# Patient Record
Sex: MALE | Race: BLACK OR AFRICAN AMERICAN | NOT HISPANIC OR LATINO | URBAN - METROPOLITAN AREA
[De-identification: names, ages, dates, MRNs, and addresses within clinical notes are randomized per-mention and may not be internally consistent; named-entity substitution may affect disease eponyms.]

---

## 2018-04-01 ENCOUNTER — EMERGENCY (EMERGENCY)
Facility: HOSPITAL | Age: 38
LOS: 1 days | Discharge: HOME | End: 2018-04-01
Admitting: EMERGENCY MEDICINE
Payer: SELF-PAY

## 2018-04-01 VITALS
DIASTOLIC BLOOD PRESSURE: 89 MMHG | HEART RATE: 95 BPM | SYSTOLIC BLOOD PRESSURE: 133 MMHG | WEIGHT: 179.9 LBS | OXYGEN SATURATION: 96 % | HEIGHT: 70 IN | RESPIRATION RATE: 18 BRPM | TEMPERATURE: 100 F

## 2018-04-01 DIAGNOSIS — M54.2 CERVICALGIA: ICD-10-CM
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
## 2018-04-01 DIAGNOSIS — M54.5 LOW BACK PAIN: ICD-10-CM

## 2018-04-01 PROCEDURE — 99283 EMERGENCY DEPT VISIT LOW MDM: CPT | Mod: 25

## 2018-04-01 PROCEDURE — 99283 EMERGENCY DEPT VISIT LOW MDM: CPT

## 2018-04-01 PROCEDURE — 96372 THER/PROPH/DIAG INJ SC/IM: CPT

## 2018-04-01 RX ORDER — DICLOFENAC SODIUM 75 MG/1
1 TABLET, DELAYED RELEASE ORAL
Qty: 30 | Refills: 0
Start: 2018-04-01 | End: 2018-04-10

## 2018-04-01 RX ORDER — DIAZEPAM 5 MG
1 TABLET ORAL
Qty: 15 | Refills: 0
Start: 2018-04-01 | End: 2018-04-05

## 2018-04-01 RX ORDER — KETOROLAC TROMETHAMINE 30 MG/ML
30 SYRINGE (ML) INJECTION ONCE
Qty: 0 | Refills: 0 | Status: DISCONTINUED | OUTPATIENT
Start: 2018-04-01 | End: 2018-04-01

## 2018-04-01 RX ORDER — DIAZEPAM 5 MG
5 TABLET ORAL ONCE
Qty: 0 | Refills: 0 | Status: DISCONTINUED | OUTPATIENT
Start: 2018-04-01 | End: 2018-04-01

## 2018-04-01 RX ADMIN — Medication 5 MILLIGRAM(S): at 14:09

## 2018-04-01 RX ADMIN — Medication 30 MILLIGRAM(S): at 14:07
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
## 2019-10-02 ENCOUNTER — INPATIENT (INPATIENT)
Facility: HOSPITAL | Age: 39
LOS: 0 days | Discharge: HOME | DRG: 313 | End: 2019-10-03
Attending: INTERNAL MEDICINE | Admitting: INTERNAL MEDICINE
Payer: COMMERCIAL

## 2019-10-02 VITALS
HEART RATE: 74 BPM | DIASTOLIC BLOOD PRESSURE: 86 MMHG | SYSTOLIC BLOOD PRESSURE: 127 MMHG | WEIGHT: 175.05 LBS | RESPIRATION RATE: 16 BRPM | OXYGEN SATURATION: 98 % | TEMPERATURE: 98 F

## 2019-10-02 DIAGNOSIS — R07.9 CHEST PAIN, UNSPECIFIED: ICD-10-CM

## 2019-10-02 DIAGNOSIS — Z91.89 OTHER SPECIFIED PERSONAL RISK FACTORS, NOT ELSEWHERE CLASSIFIED: ICD-10-CM

## 2019-10-02 DIAGNOSIS — R63.8 OTHER SYMPTOMS AND SIGNS CONCERNING FOOD AND FLUID INTAKE: ICD-10-CM

## 2019-10-02 LAB
CHOLEST SERPL-MCNC: 131 MG/DL — SIGNIFICANT CHANGE UP (ref 10–199)
CK MB CFR SERPL CALC: 1.3 NG/ML — SIGNIFICANT CHANGE UP (ref 0–6.7)
CK SERPL-CCNC: 140 U/L — SIGNIFICANT CHANGE UP (ref 30–200)
CRP SERPL-MCNC: 0.06 MG/DL — SIGNIFICANT CHANGE UP (ref 0–0.4)
ERYTHROCYTE [SEDIMENTATION RATE] IN BLOOD: 1 MM/HR — SIGNIFICANT CHANGE UP
HDLC SERPL-MCNC: 45 MG/DL — SIGNIFICANT CHANGE UP
LIPID PNL WITH DIRECT LDL SERPL: 75 MG/DL — SIGNIFICANT CHANGE UP
TOTAL CHOLESTEROL/HDL RATIO MEASUREMENT: 2.9 RATIO — LOW (ref 3.4–9.6)
TRIGL SERPL-MCNC: 56 MG/DL — SIGNIFICANT CHANGE UP (ref 10–149)
TROPONIN T SERPL-MCNC: <0.01 NG/ML — SIGNIFICANT CHANGE UP (ref 0–0.01)
TSH SERPL-MCNC: 0.79 UIU/ML — SIGNIFICANT CHANGE UP (ref 0.35–4.94)

## 2019-10-02 PROCEDURE — 93010 ELECTROCARDIOGRAM REPORT: CPT | Mod: 76

## 2019-10-02 PROCEDURE — 71046 X-RAY EXAM CHEST 2 VIEWS: CPT | Mod: 26

## 2019-10-02 PROCEDURE — 99285 EMERGENCY DEPT VISIT HI MDM: CPT | Mod: 25

## 2019-10-02 RX ORDER — ACETAMINOPHEN 500 MG
650 TABLET ORAL EVERY 6 HOURS
Refills: 0 | Status: DISCONTINUED | OUTPATIENT
Start: 2019-10-02 | End: 2019-10-03

## 2019-10-02 RX ORDER — ATORVASTATIN CALCIUM 80 MG/1
80 TABLET, FILM COATED ORAL AT BEDTIME
Refills: 0 | Status: DISCONTINUED | OUTPATIENT
Start: 2019-10-02 | End: 2019-10-03

## 2019-10-02 RX ORDER — ACETAMINOPHEN 500 MG
650 TABLET ORAL ONCE
Refills: 0 | Status: COMPLETED | OUTPATIENT
Start: 2019-10-02 | End: 2019-10-02

## 2019-10-02 RX ORDER — CLOPIDOGREL BISULFATE 75 MG/1
300 TABLET, FILM COATED ORAL ONCE
Refills: 0 | Status: DISCONTINUED | OUTPATIENT
Start: 2019-10-02 | End: 2019-10-02

## 2019-10-02 RX ORDER — NITROGLYCERIN 6.5 MG
0.4 CAPSULE, EXTENDED RELEASE ORAL ONCE
Refills: 0 | Status: COMPLETED | OUTPATIENT
Start: 2019-10-02 | End: 2019-10-02

## 2019-10-02 RX ORDER — CLOPIDOGREL BISULFATE 75 MG/1
600 TABLET, FILM COATED ORAL ONCE
Refills: 0 | Status: COMPLETED | OUTPATIENT
Start: 2019-10-02 | End: 2019-10-02

## 2019-10-02 RX ORDER — ASPIRIN/CALCIUM CARB/MAGNESIUM 324 MG
325 TABLET ORAL ONCE
Refills: 0 | Status: COMPLETED | OUTPATIENT
Start: 2019-10-02 | End: 2019-10-02

## 2019-10-02 RX ADMIN — Medication 650 MILLIGRAM(S): at 17:27

## 2019-10-02 RX ADMIN — CLOPIDOGREL BISULFATE 600 MILLIGRAM(S): 75 TABLET, FILM COATED ORAL at 22:30

## 2019-10-02 RX ADMIN — Medication 325 MILLIGRAM(S): at 17:27

## 2019-10-02 RX ADMIN — ATORVASTATIN CALCIUM 80 MILLIGRAM(S): 80 TABLET, FILM COATED ORAL at 22:30

## 2019-10-02 RX ADMIN — Medication 0.4 MILLIGRAM(S): at 22:30

## 2019-10-02 NOTE — H&P ADULT - HISTORY OF PRESENT ILLNESS
Patient is a 37 y/o m w/ no significant past medical history who presented to the emergency department due to chest pain that was 8-9/10 that started last night. He states the pain is sharp. It is not reproducible with palpation. He denies any tingling or radiation of the pain. Has tried tylenol without any relief of symptoms. Still able to walk several blocks and climb multiple steps without issues. Denies shortness of breath at nighttime or swelling in lower extremities. Has had similar pain occur 9 years ago at [facility] and pain resolved on its own. States "everything was normal" and denies any intervention. Denies fevers, chills, nausea, vomiting, or diarrhea. Denies runny nose, sick contacts, or travels    In the emergency department vitals were: /78, 98.4, 58, 16 bpm saturation 98%  EKG showed st elevation 1 mm on lead avF, and <1 mm on 2 and 3. Q waves present anterolateral leads. sinus bradycardia HR 58 Patient is a 37 y/o m w/ no significant past medical history who presented to the emergency department due to chest pain that was 8-9/10 that started last night. He states the pain is sharp. It is not reproducible with palpation. The pain goes to his back. He denies any tingling or radiation of the pain. Has tried tylenol without any relief of symptoms. Still able to walk several blocks and climb multiple steps without issues. Denies shortness of breath at nighttime or swelling in lower extremities. Has had similar pain occur 9 years ago at [facility] and pain resolved on its own. States "everything was normal" and denies any intervention. Denies fevers, chills, nausea, vomiting, or diarrhea. Denies runny nose, sick contacts, or travels    In the emergency department vitals were: /78, 98.4, 58, 16 bpm saturation 98%  EKG showed st elevation 1 mm on lead avF, and <1 mm on 2 and 3. Q waves present anterolateral leads. sinus bradycardia HR 58

## 2019-10-02 NOTE — H&P ADULT - NSHPLABSRESULTS_GEN_ALL_CORE
LABS:                         15.1   7.21  )-----------( 266      ( 02 Oct 2019 17:15 )             44.4     10-02    140  |  103  |  12  ----------------------------<  93  4.9   |  28  |  0.95    Ca    9.8      02 Oct 2019 17:15    TPro  8.0  /  Alb  4.8  /  TBili  1.9<H>  /  DBili  x   /  AST  21  /  ALT  11  /  AlkPhos  48  10-02    PT/INR - ( 02 Oct 2019 17:14 )   PT: 12.6 sec;   INR: 1.11          PTT - ( 02 Oct 2019 17:14 )  PTT:29.1 sec    CARDIAC MARKERS ( 02 Oct 2019 17:15 )  x     / <0.01 ng/mL / 149 U/L / x     / 1.4 ng/mL            RADIOLOGY, EKG & ADDITIONAL TESTS: Reviewed.

## 2019-10-02 NOTE — CHART NOTE - NSCHARTNOTEFT_GEN_A_CORE
Cardiology called for abnormal EKG. 39 yo M with stabbing chest pain since last night with no associated symptoms. EKG showed Q waves in the inferior and lateral leads with mild ST elevations. Bedside echocardiogram showed no wall motion abnormalities and normal EF. Case discussed with interventional cardiology, no indication for urgent cardiac catheterization at this time. Will admit to cardiac telemetry to rule out acute coronary syndrome. Consider treatment for pericarditis.     Angelica Weiss  Cardiology Fellow
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
## 2019-10-02 NOTE — H&P ADULT - PROBLEM SELECTOR PLAN 1
-r/o unstable angina  -Patient with chest pain since last night, EKG with 1 mm st elevation avF and ? 1mm elevation 2 and 3, with q waves present.  -post aspirin load in ED  -plan for cardiac catheterization tomorrow NPO past midnight  -echocardiogram  -f/u lipid panel, A1C, and TSH -r/o unstable angina acs vs pericarditis  -Patient with chest pain since last night, EKG with 1 mm st elevation avF and ? 1mm elevation 2 and 3, with q waves present.  -ok to try sublingual nitrate prn chest pain  -post aspirin load and plavix load in ED  -plan for cardiac catheterization tomorrow NPO past midnight  -echocardiogram  -esr crp f/u due to concern for pericarditis.   -f/u lipid panel, A1C, and TSH -r/o unstable angina acs vs pericarditis  -Patient with chest pain since last night, EKG with 1 mm st elevation avF and ? 1mm elevation 2 and 3, with q waves present.  -ok to try sublingual nitrate prn chest pain  -post aspirin load and plavix load in ED  -possible cardiac catheterization tomorrow NPO past midnight  -echocardiogram  -esr crp f/u due to concern for pericarditis.   -f/u lipid panel, A1C, and TSH -r/o unstable angina acs vs pericarditis  -Patient with chest pain since last night, EKG with 1 mm st elevation avF and ? 1mm elevation 2 and 3, with q waves present.  -ok to try sublingual nitrate prn chest pain  -post aspirin load and plavix load in ED  -possible cardiac catheterization tomorrow NPO past midnight  -echocardiogram  -esr crp wnl, less concern for pericarditis.   -f/u lipid panel, A1C, and TSH

## 2019-10-02 NOTE — H&P ADULT - ASSESSMENT
Patient is a 37 y/o m w/ no significant past medical history who presented to the emergency department due to chest pain that was 8-9/10 that started last night here for rule out ACS

## 2019-10-02 NOTE — ED ADULT NURSE NOTE - NSIMPLEMENTINTERV_GEN_ALL_ED
Implemented All Universal Safety Interventions:  [place] to call system. Call bell, personal items and telephone within reach. Instruct patient to call for assistance. Room bathroom lighting operational. Non-slip footwear when patient is off stretcher. Physically safe environment: no spills, clutter or unnecessary equipment. Stretcher in lowest position, wheels locked, appropriate side rails in place.

## 2019-10-02 NOTE — H&P ADULT - PROBLEM SELECTOR PLAN 2
F-none  E-replete K<4 or Mag <2  N- DASH TLC diet  full code  disposition 5 lachman under cardiology

## 2019-10-02 NOTE — ED PROVIDER NOTE - PROGRESS NOTE DETAILS
Klepfish: labs grossly wnl, repeat ekg unchanged, still in pain but improving, rediscussed w/ cards, will admit for further care. msk vs. acs vs. pericarditis.

## 2019-10-02 NOTE — ED PROVIDER NOTE - CLINICAL SUMMARY MEDICAL DECISION MAKING FREE TEXT BOX
38M no PMh p/w constant CP since yesterday, no other systemic symptoms. No PE or cardiac risk factors. Vitals wnl, exam as above.  ddx: Likely MSK vs. GERD/gastritis/PUD. However EKG w/ subtle ERVIN (though concave and no reciprocal changes). possible pericarditis.   clinically no indication to activate cath lab right now. Cards consulted, will trend ekg. basic labs, cxr, symptom control, reassess.

## 2019-10-03 VITALS — TEMPERATURE: 99 F

## 2019-10-03 LAB
ANION GAP SERPL CALC-SCNC: 9 MMOL/L — SIGNIFICANT CHANGE UP (ref 5–17)
BUN SERPL-MCNC: 11 MG/DL — SIGNIFICANT CHANGE UP (ref 7–23)
CALCIUM SERPL-MCNC: 9.1 MG/DL — SIGNIFICANT CHANGE UP (ref 8.4–10.5)
CHLORIDE SERPL-SCNC: 105 MMOL/L — SIGNIFICANT CHANGE UP (ref 96–108)
CK MB CFR SERPL CALC: 1.1 NG/ML — SIGNIFICANT CHANGE UP (ref 0–6.7)
CO2 SERPL-SCNC: 26 MMOL/L — SIGNIFICANT CHANGE UP (ref 22–31)
CREAT SERPL-MCNC: 0.92 MG/DL — SIGNIFICANT CHANGE UP (ref 0.5–1.3)
GLUCOSE SERPL-MCNC: 87 MG/DL — SIGNIFICANT CHANGE UP (ref 70–99)
HCT VFR BLD CALC: 40.5 % — SIGNIFICANT CHANGE UP (ref 39–50)
HGB BLD-MCNC: 13.8 G/DL — SIGNIFICANT CHANGE UP (ref 13–17)
MAGNESIUM SERPL-MCNC: 2 MG/DL — SIGNIFICANT CHANGE UP (ref 1.6–2.6)
MCHC RBC-ENTMCNC: 26.3 PG — LOW (ref 27–34)
MCHC RBC-ENTMCNC: 34.1 GM/DL — SIGNIFICANT CHANGE UP (ref 32–36)
MCV RBC AUTO: 77.1 FL — LOW (ref 80–100)
NRBC # BLD: 0 /100 WBCS — SIGNIFICANT CHANGE UP (ref 0–0)
PLATELET # BLD AUTO: 244 K/UL — SIGNIFICANT CHANGE UP (ref 150–400)
POTASSIUM SERPL-MCNC: 4.2 MMOL/L — SIGNIFICANT CHANGE UP (ref 3.5–5.3)
POTASSIUM SERPL-SCNC: 4.2 MMOL/L — SIGNIFICANT CHANGE UP (ref 3.5–5.3)
RBC # BLD: 5.25 M/UL — SIGNIFICANT CHANGE UP (ref 4.2–5.8)
RBC # FLD: 13.2 % — SIGNIFICANT CHANGE UP (ref 10.3–14.5)
SODIUM SERPL-SCNC: 140 MMOL/L — SIGNIFICANT CHANGE UP (ref 135–145)
TROPONIN T SERPL-MCNC: <0.01 NG/ML — SIGNIFICANT CHANGE UP (ref 0–0.01)
WBC # BLD: 7.64 K/UL — SIGNIFICANT CHANGE UP (ref 3.8–10.5)
WBC # FLD AUTO: 7.64 K/UL — SIGNIFICANT CHANGE UP (ref 3.8–10.5)

## 2019-10-03 PROCEDURE — 85652 RBC SED RATE AUTOMATED: CPT

## 2019-10-03 PROCEDURE — 80053 COMPREHEN METABOLIC PANEL: CPT

## 2019-10-03 PROCEDURE — 84484 ASSAY OF TROPONIN QUANT: CPT

## 2019-10-03 PROCEDURE — 80048 BASIC METABOLIC PNL TOTAL CA: CPT

## 2019-10-03 PROCEDURE — 83735 ASSAY OF MAGNESIUM: CPT

## 2019-10-03 PROCEDURE — 93306 TTE W/DOPPLER COMPLETE: CPT | Mod: 26

## 2019-10-03 PROCEDURE — 75574 CT ANGIO HRT W/3D IMAGE: CPT | Mod: 26

## 2019-10-03 PROCEDURE — G0378: CPT

## 2019-10-03 PROCEDURE — 85025 COMPLETE CBC W/AUTO DIFF WBC: CPT

## 2019-10-03 PROCEDURE — 93306 TTE W/DOPPLER COMPLETE: CPT

## 2019-10-03 PROCEDURE — 86140 C-REACTIVE PROTEIN: CPT

## 2019-10-03 PROCEDURE — 75574 CT ANGIO HRT W/3D IMAGE: CPT

## 2019-10-03 PROCEDURE — 93010 ELECTROCARDIOGRAM REPORT: CPT

## 2019-10-03 PROCEDURE — 84443 ASSAY THYROID STIM HORMONE: CPT

## 2019-10-03 PROCEDURE — 36415 COLL VENOUS BLD VENIPUNCTURE: CPT

## 2019-10-03 PROCEDURE — 80061 LIPID PANEL: CPT

## 2019-10-03 PROCEDURE — 85027 COMPLETE CBC AUTOMATED: CPT

## 2019-10-03 PROCEDURE — 85730 THROMBOPLASTIN TIME PARTIAL: CPT

## 2019-10-03 PROCEDURE — 99223 1ST HOSP IP/OBS HIGH 75: CPT

## 2019-10-03 PROCEDURE — 85610 PROTHROMBIN TIME: CPT

## 2019-10-03 PROCEDURE — 71046 X-RAY EXAM CHEST 2 VIEWS: CPT

## 2019-10-03 PROCEDURE — 82553 CREATINE MB FRACTION: CPT

## 2019-10-03 PROCEDURE — 93005 ELECTROCARDIOGRAM TRACING: CPT

## 2019-10-03 PROCEDURE — 99285 EMERGENCY DEPT VISIT HI MDM: CPT

## 2019-10-03 PROCEDURE — 82550 ASSAY OF CK (CPK): CPT

## 2019-10-03 RX ORDER — FAMOTIDINE 10 MG/ML
20 INJECTION INTRAVENOUS ONCE
Refills: 0 | Status: COMPLETED | OUTPATIENT
Start: 2019-10-03 | End: 2019-10-03

## 2019-10-03 RX ORDER — CLOPIDOGREL BISULFATE 75 MG/1
75 TABLET, FILM COATED ORAL DAILY
Refills: 0 | Status: DISCONTINUED | OUTPATIENT
Start: 2019-10-03 | End: 2019-10-03

## 2019-10-03 RX ORDER — ASPIRIN/CALCIUM CARB/MAGNESIUM 324 MG
81 TABLET ORAL DAILY
Refills: 0 | Status: DISCONTINUED | OUTPATIENT
Start: 2019-10-03 | End: 2019-10-03

## 2019-10-03 RX ADMIN — Medication 81 MILLIGRAM(S): at 11:59

## 2019-10-03 RX ADMIN — Medication 30 MILLILITER(S): at 13:19

## 2019-10-03 RX ADMIN — FAMOTIDINE 20 MILLIGRAM(S): 10 INJECTION INTRAVENOUS at 19:04

## 2019-10-03 NOTE — DISCHARGE NOTE PROVIDER - HOSPITAL COURSE
37 y/o m w/ no significant past medical history who presented to the emergency department due to chest pain that was 8-9/10 that started last night. He states the pain is sharp. It is not reproducible with palpation. The pain goes to his back. He denies any tingling or radiation of the pain. Has tried tylenol without any relief of symptoms. Still able to walk several blocks and climb multiple steps without issues. Denies shortness of breath at nighttime or swelling in lower extremities. Has had similar pain occur 9 years ago at [facility] and pain resolved on its own. States "everything was normal" and denies any intervention. Denies fevers, chills, nausea, vomiting, or diarrhea. Denies runny nose, sick contacts, or travels        In the emergency department vitals were: /78, 98.4, 58, 16 bpm saturation 98%    EKG showed st elevation 1 mm on lead avF, and <1 mm on 2 and 3. Q waves present anterolateral leads. sinus bradycardia HR 58 37 y/o M w/ no significant past medical history who presents c/o 7/10 atypical chest pain that was 7/10 that started last night while watching TV. Pt described chest pain as sharp and constant in nature, initially started on the L lower rib w/o exacerbation or relieving factors. Still able to walk several blocks and climb multiple steps without issues. as had similar pain occur 9 years ago at [facility] and pain resolved on its own. States "everything was normal" and denies any intervention. Denies fevers, chills, nausea, vomiting, or diarrhea. Denies runny nose, sick contacts, or travels        In the emergency department vitals were: /78, 98.4, 58, 16 bpm saturation 98%    EKG showed st elevation 1 mm on lead avF, and <1 mm on 2 and 3. Q waves present anterolateral leads. sinus bradycardia HR 58 37 y/o M w/ no significant past medical history who presents c/o 7/10 atypical chest pain that started last night while watching TV. Pt described chest pain as sharp and constant in nature, initially started on the L lower rib, radiating to L lateral side, then across R chest, then epigastric region. Is w/o exacerbation or relieving factors. Still able to walk several blocks and climb multiple steps without issues. as had similar pain occur 9 years ago at [facility] and pain resolved on its own, had negative stress test. He was admitted to [facility] and ruled out for ACS. VSS. Trop negative x3. EKG showed SB 50s, early repolarization. ECHO performed was unremarkable w/ EF 55%. CTA Cardiac w/ zero calcium score, normal coronaries. He is medically stable for discharge. will f/u with PMD for further w/u of chest pain. 37 y/o M w/ no significant past medical history who presents c/o 7/10 atypical chest pain that started last night while watching TV. Pt described chest pain as sharp and constant in nature, initially started on the L lower rib, radiating to L lateral side, then across R chest, then epigastric region. Is w/o exacerbation or relieving factors. Still able to walk several blocks and climb multiple steps without issues. as had similar pain occur 9 years ago at [facility] and pain resolved on its own, had negative stress test. He was admitted to [facility] and ruled out for ACS. VSS. Trop negative x3. EKG showed SB 50s, early repolarization. ECHO performed was unremarkable w/ EF 55%. CTA Cardiac w/ zero calcium score, normal coronaries. He is medically stable for discharge, will f/u with PMD for further w/u of chest pain.

## 2019-10-03 NOTE — DISCHARGE NOTE PROVIDER - CARE PROVIDER_API CALL
Greg Olivera)  Internal Medicine  [address] [place] [id number]  Phone: [phone]  Fax: [phone]  Follow Up Time: 2 weeks

## 2019-10-03 NOTE — DISCHARGE NOTE NURSING/CASE MANAGEMENT/SOCIAL WORK - PATIENT PORTAL LINK FT
You can access the FollowMyHealth Patient Portal offered by [facility] by registering at the following website: [email or web address]. By joining [employer]’s FollowMyHealth portal, you will also be able to view your health information using other applications (apps) compatible with our system.

## 2019-10-03 NOTE — SBIRT NOTE ADULT - NSSBIRTALCPOSREINDET_GEN_A_CORE
Patient reported drinking 1-2 beers 2-3x/week. Patient stated that he is able to drink in moderation.  offered patient resources but patient declined.

## 2019-10-03 NOTE — DISCHARGE NOTE PROVIDER - NSDCCPCAREPLAN_GEN_ALL_CORE_FT
PRINCIPAL DISCHARGE DIAGNOSIS  Diagnosis: Chest pain  Assessment and Plan of Treatment: You came into the hospital for chest pain. You had a cardiac workup and was unremarkable. PRINCIPAL DISCHARGE DIAGNOSIS  Diagnosis: Chest pain  Assessment and Plan of Treatment: You came into the hospital for chest pain. You had a cardiac workup and was unremarkable. You had echocardiogram, CTA Scan of the Heart, EKG, bloodwork that was all unremarkable. The chest pain is not due to your heart. Follow up with primary care physician for further work up of your pain.

## 2019-10-07 DIAGNOSIS — R07.9 CHEST PAIN, UNSPECIFIED: ICD-10-CM
